# Patient Record
Sex: MALE | Race: WHITE | NOT HISPANIC OR LATINO | Employment: FULL TIME | URBAN - METROPOLITAN AREA
[De-identification: names, ages, dates, MRNs, and addresses within clinical notes are randomized per-mention and may not be internally consistent; named-entity substitution may affect disease eponyms.]

---

## 2020-10-15 ENCOUNTER — HISTORICAL (OUTPATIENT)
Dept: WOUND CARE | Facility: HOSPITAL | Age: 30
End: 2020-10-15

## 2021-06-10 ENCOUNTER — HISTORICAL (OUTPATIENT)
Dept: ADMINISTRATIVE | Facility: HOSPITAL | Age: 31
End: 2021-06-10

## 2021-06-10 LAB
ABS NEUT (OLG): 5.4 X10(3)/MCL (ref 2.1–9.2)
ALBUMIN SERPL-MCNC: 5.1 GM/DL (ref 3.4–5)
ALBUMIN/GLOB SERPL: 1.96 {RATIO} (ref 1.5–2.5)
ALP SERPL-CCNC: 84 UNIT/L (ref 38–126)
ALT SERPL-CCNC: 69 UNIT/L (ref 7–52)
AST SERPL-CCNC: 32 UNIT/L (ref 15–37)
BILIRUB SERPL-MCNC: 0.4 MG/DL (ref 0.2–1)
BILIRUBIN DIRECT+TOT PNL SERPL-MCNC: 0.1 MG/DL (ref 0–0.5)
BILIRUBIN DIRECT+TOT PNL SERPL-MCNC: 0.3 MG/DL
BUN SERPL-MCNC: 19 MG/DL (ref 7–18)
CALCIUM SERPL-MCNC: 10.1 MG/DL (ref 8.5–10.1)
CHLORIDE SERPL-SCNC: 102 MMOL/L (ref 98–107)
CHOLEST SERPL-MCNC: 318 MG/DL (ref 0–200)
CHOLEST/HDLC SERPL: 9.1 {RATIO}
CO2 SERPL-SCNC: 26 MMOL/L (ref 21–32)
CREAT SERPL-MCNC: 1.07 MG/DL (ref 0.6–1.3)
ERYTHROCYTE [DISTWIDTH] IN BLOOD BY AUTOMATED COUNT: 12.1 % (ref 11.5–17)
EST. AVERAGE GLUCOSE BLD GHB EST-MCNC: 126 MG/DL
GLOBULIN SER-MCNC: 2.6 GM/DL (ref 1.2–3)
GLUCOSE SERPL-MCNC: 123 MG/DL (ref 74–106)
HBA1C MFR BLD: 6 % (ref 4.4–6.4)
HCT VFR BLD AUTO: 45.5 % (ref 42–52)
HDLC SERPL-MCNC: 35 MG/DL (ref 35–60)
HGB BLD-MCNC: 15.8 GM/DL (ref 14–18)
LDLC SERPL CALC-MCNC: 120 MG/DL (ref 0–129)
LYMPHOCYTES # BLD AUTO: 3.4 X10(3)/MCL (ref 0.6–3.4)
LYMPHOCYTES NFR BLD AUTO: 37 % (ref 13–40)
MCH RBC QN AUTO: 30.7 PG (ref 27–31.2)
MCHC RBC AUTO-ENTMCNC: 35 GM/DL (ref 32–36)
MCV RBC AUTO: 88 FL (ref 80–94)
MONOCYTES # BLD AUTO: 0.5 X10(3)/MCL (ref 0.1–1.3)
MONOCYTES NFR BLD AUTO: 5.9 % (ref 0.1–24)
NEUTROPHILS NFR BLD AUTO: 57.1 % (ref 47–80)
PLATELET # BLD AUTO: 309 X10(3)/MCL (ref 130–400)
PMV BLD AUTO: 9.5 FL (ref 9.4–12.4)
POTASSIUM SERPL-SCNC: 4.5 MMOL/L (ref 3.5–5.1)
PROT SERPL-MCNC: 7.7 GM/DL (ref 6.4–8.2)
RBC # BLD AUTO: 5.14 X10(6)/MCL (ref 4.7–6.1)
SODIUM SERPL-SCNC: 139 MMOL/L (ref 136–145)
TRIGL SERPL-MCNC: 1143 MG/DL (ref 30–150)
TSH SERPL-ACNC: 2.75 MIU/ML (ref 0.35–4.94)
VLDLC SERPL CALC-MCNC: 228.6 MG/DL
WBC # SPEC AUTO: 9.3 X10(3)/MCL (ref 4.5–11.5)

## 2022-04-10 ENCOUNTER — HISTORICAL (OUTPATIENT)
Dept: ADMINISTRATIVE | Facility: HOSPITAL | Age: 32
End: 2022-04-10

## 2022-04-30 VITALS
DIASTOLIC BLOOD PRESSURE: 80 MMHG | BODY MASS INDEX: 30.36 KG/M2 | WEIGHT: 212.06 LBS | HEIGHT: 70 IN | OXYGEN SATURATION: 97 % | SYSTOLIC BLOOD PRESSURE: 129 MMHG

## 2022-05-02 NOTE — HISTORICAL [facility]
This is a historical note converted from Pily. Formatting and pictures may have been removed.  Please reference Pily for original formatting and attached multimedia. Chief Complaint  Avulsion of fingertip at work  History of Present Illness  Incision/Wounds  ?1. Finger Right Other: tip Avulsion?- last charted: 11/05/2020 10:16  ?? ??Assessment Done By?- Wound Care Team  ?? ??Dressing Type?- Collagen, Nonadherent dressing, Rolled Gauze, Tape  ?? ??Dressing Assessment?- Drainage present, Intact  ?? ??Dressing Activity?- Removed  ?? ??Cleansing?- Cleaned with normal saline  ?? ??Length?- 0.8 cm  ?? ??Width?- 0.9 cm  ?? ??Depth?- 0.1 cm  ?? ??Wound Bed Tissue Type?- Epithelialized, Granulating, Necrotic tissue, slough  ?? ??Percent Granulated?- 65 %  ?? ??Percent Epithelialized?- 35 %  ?? ??Exudate Amount?- Small  ?? ??Exudate Type?- Serous  ?? ??Exudate Odor?- None  ?? ??Edge?- Well defined  ?? ??Surrounding Tissue Color?- Normal  ?? ??Surrounding Tissue Condition?- Intact  Area is granulating well.? Epithelialize well.? Responding to collagen?dressings.? Patient is continue collagen every other day and?return in 1 week for follow-up.  Physical Exam  Vitals & Measurements  T:?36.6? ?C (Oral)? HR:?82(Peripheral)? RR:?18? BP:?120/86? SpO2:?99%?  HT:?176?cm? WT:?98?kg?  Assessment/Plan  Avulsion injury?T14.8XXA  Ordered:  Wound Care Outpatient, *Est. 11/12/20 3:00:00 CST, *Est. Stop date 11/12/20 3:00:00 CST, [facility], FU with Physician in 1 week  Wound Care Team Treatment, 11/05/20 10:34:00 CST, Stop date 11/05/20 10:34:00 CST, Collagen every other day and return in 1 week  ?   Problem List/Past Medical History  Ongoing  No qualifying data  Historical  No qualifying data  Procedure/Surgical History  none   Medications  Keflex 250 mg oral capsule, 250 mg= 1 cap(s), Oral, QID,? ?Not Taking, Completed Rx  Norco 325 mg-5 mg oral tablet, 1 tab(s), Oral, q6hr, PRN,? ?Not Taking, Completed Rx  Allergies  No Known  Allergies  Social History  Abuse/Neglect  No, 10/15/2020  Tobacco  Former smoker, quit more than 30 days ago, Cigarettes, No, 10/15/2020  10 or more cigarettes (1/2 pack or more)/day in last 30 days, Yes, 05/18/2019  Family History  Family history is negative  Immunizations  Vaccine Date Status   tetanus/diphtheria/pertussis, acel(Tdap) 10/08/2020 Given   Health Maintenance  Health Maintenance  ???Pending?(in the next year)  ??? ??OverDue  ??? ? ? ?Obesity Screening due??01/01/20??and every 1??year(s)  ??? ? ? ?Alcohol Misuse Screening due??01/02/20??and every 1??year(s)  ??? ? ? ?Body Mass Index Check due??05/17/20??and every 1??year(s)  ??? ??Due?  ??? ? ? ?Influenza Vaccine due??10/01/20??and every 1??day(s)  ??? ? ? ?ADL Screening due??11/05/20??and every 1??year(s)  ??? ? ? ?Depression Screening due??11/05/20??Unknown Frequency  ??? ??Due In Future?  ??? ? ? ?Blood Pressure Screening not due until??10/29/21??and every 1??year(s)  ???Satisfied?(in the past 1 year)  ??? ??Satisfied?  ??? ? ? ?Blood Pressure Screening on??11/05/20.??Satisfied by SATNAM Groves Shawndala  ??? ? ? ?Influenza Vaccine on??10/15/20.??Satisfied by Marion BALLARD, My Samuels  ??? ? ? ?Tetanus Vaccine on??10/08/20.??Satisfied by Robert BALLARD, Katarina Bond  ?

## 2022-05-02 NOTE — HISTORICAL [facility]
This is a historical note converted from Pily. Formatting and pictures may have been removed.  Please reference Pily for original formatting and attached multimedia. Chief Complaint  Avulsion of fingertip at work  History of Present Illness  see nurse notes  Review of Systems  see nurse notes  Physical Exam  Vitals & Measurements  T:?36.3? ?C (Oral)? HR:?110(Peripheral)? RR:?18? BP:?118/86? SpO2:?96%?  HT:?176?cm? WT:?98?kg?  see nurse notes  Assessment/Plan  Avulsion injury?T14.8XXA  ?Incision/Wounds  ?1. Finger Right Other: tip Avulsion?- last charted: 12/03/2020 10:32  ?? ??Assessment Done By?- Wound Care Team  ?? ??Dressing Type?- Rolled Gauze, Tape  ?? ??Dressing Assessment?- Dry, Intact  ?? ??Dressing Activity?- Removed  ?? ??Cleansing?- Cleaned with normal saline  ?? ??Length?- 0.1 cm  ?? ??Width?- 0.7 cm  ?? ??Depth?- 0 cm  ?? ??Wound Bed Tissue Type?- Scab  ?? ??Exudate Amount?- None  ?? ??Edge?- Approximated  ?? ??Surrounding Tissue Color?- Normal  ?? ??Surrounding Tissue Condition?- Intact  ?? ??Status?- Healed, Improving  ?Pt examined and review notes above. Wound is healed. Plans is to D/C the patient and follow up as needed.  Ordered:  Wound Care Team Treatment, 12/03/20 10:36:00 CST, Stop date 12/03/20 10:36:00 CST, discharge patient and rtc if need .  ?   Problem List/Past Medical History  Ongoing  No qualifying data  Historical  No qualifying data  Procedure/Surgical History  none   Medications  Keflex 250 mg oral capsule, 250 mg= 1 cap(s), Oral, QID,? ?Not Taking, Completed Rx  Norco 325 mg-5 mg oral tablet, 1 tab(s), Oral, q6hr, PRN,? ?Not Taking, Completed Rx  Allergies  No Known Allergies  Social History  Abuse/Neglect  No, 10/15/2020  Tobacco  Former smoker, quit more than 30 days ago, Cigarettes, No, 10/15/2020  10 or more cigarettes (1/2 pack or more)/day in last 30 days, Yes, 05/18/2019  Family History  Family history is negative  Immunizations  Vaccine Date Status    tetanus/diphtheria/pertussis, acel(Tdap) 10/08/2020 Given   Health Maintenance  Health Maintenance  ???Pending?(in the next year)  ??? ??OverDue  ??? ? ? ?Obesity Screening due??01/01/20??and every 1??year(s)  ??? ? ? ?Alcohol Misuse Screening due??01/02/20??and every 1??year(s)  ??? ? ? ?Body Mass Index Check due??05/17/20??and every 1??year(s)  ??? ? ? ?Influenza Vaccine due??10/01/20??and every 1??day(s)  ??? ??Due?  ??? ? ? ?ADL Screening due??12/03/20??and every 1??year(s)  ??? ? ? ?Depression Screening due??12/03/20??Unknown Frequency  ??? ??Due In Future?  ??? ? ? ?Blood Pressure Screening not due until??11/25/21??and every 1??year(s)  ???Satisfied?(in the past 1 year)  ??? ??Satisfied?  ??? ? ? ?Blood Pressure Screening on??12/03/20.??Satisfied by Zoey Medina  ??? ? ? ?Influenza Vaccine on??10/15/20.??Satisfied by My Schultz RN  ??? ? ? ?Tetanus Vaccine on??10/08/20.??Satisfied by Robert BALLARD, Katarina Bond  ?

## 2022-05-02 NOTE — HISTORICAL [facility]
This is a historical note converted from Pily. Formatting and pictures may have been removed.  Please reference Pliy for original formatting and attached multimedia. Chief Complaint  Avulsion of fingertip at work  History of Present Illness  see nurse notes  Review of Systems  see nurse notes  Physical Exam  Vitals & Measurements  T:?36.6? ?C (Oral)? HR:?89(Monitored)? RR:?18? BP:?116/78? SpO2:?98%?  HT:?176?cm? WT:?98?kg?  see nurse notes  Assessment/Plan  Avulsion injury?T14.8XXA  ?Incision/Wounds  ?1. Finger Right Other: tip Avulsion?- last charted: 10/29/2020 10:39  ?? ??Assessment Done By?- Wound Care Team  ?? ??Dressing Type?- Collagen, Nonadherent dressing, Rolled Gauze, Tape  ?? ??Dressing Assessment?- Drainage present, Intact  ?? ??Dressing Activity?- Changed  ?? ??Cleansing?- Cleaned with normal saline  ?? ??Length?- 0.7 cm  ?? ??Width?- 1.0 cm  ?? ??Depth?- 0.1 cm  ?? ??Wound Bed Tissue Type?- Epithelialized, Granulating, Necrotic tissue, slough  ?? ??Percent Granulated?- 65 %  ?? ??Percent Epithelialized?- 35 %  ?? ??Exudate Amount?- Small  ?? ??Exudate Type?- Serous  ?? ??Exudate Odor?- None  ?? ??Edge?- Well defined  ?? ??Surrounding Tissue Color?- Normal  ?? ??Surrounding Tissue Condition?- Intact  ?Pt examined and notes review above. Plans is to continue collagen QOD. RTC in 1 week. Wound is looking great.  Ordered:  Wound Care Outpatient, *Est. 11/12/20 3:00:00 CST, *Est. Stop date 11/12/20 3:00:00 CST, [facility], Return to Clinic 2 weeks  Wound Care Outpatient, *Est. 11/05/20 3:00:00 CST, *Est. Stop date 11/05/20 3:00:00 CST, [facility], FU with Physician in 1 week  Wound Care Team Treatment, 10/29/20 11:00:00 CDT, Stop date 10/29/20 11:00:00 CDT, continue colagen QOD. RTC in 1 week  ?   Problem List/Past Medical History  Ongoing  No qualifying data  Historical  No qualifying data  Procedure/Surgical History  none   Medications  Keflex 250 mg oral capsule, 250 mg= 1 cap(s),  Oral, QID,? ?Not Taking, Completed Rx  Norco 325 mg-5 mg oral tablet, 1 tab(s), Oral, q6hr, PRN,? ?Not Taking, Completed Rx  Allergies  No Known Allergies  Social History  Abuse/Neglect  No, 10/15/2020  Tobacco  Former smoker, quit more than 30 days ago, Cigarettes, No, 10/15/2020  10 or more cigarettes (1/2 pack or more)/day in last 30 days, Yes, 05/18/2019  Family History  Family history is negative  Immunizations  Vaccine Date Status   tetanus/diphtheria/pertussis, acel(Tdap) 10/08/2020 Given   Health Maintenance  Health Maintenance  ???Pending?(in the next year)  ??? ??OverDue  ??? ? ? ?Influenza Vaccine due??10/01/19??and every 1??day(s)  ??? ? ? ?Obesity Screening due??01/01/20??and every 1??year(s)  ??? ? ? ?Alcohol Misuse Screening due??01/02/20??and every 1??year(s)  ??? ? ? ?Body Mass Index Check due??05/17/20??and every 1??year(s)  ??? ??Due?  ??? ? ? ?ADL Screening due??10/29/20??and every 1??year(s)  ??? ? ? ?Depression Screening due??10/29/20??Unknown Frequency  ??? ??Due In Future?  ??? ? ? ?Blood Pressure Screening not due until??10/26/21??and every 1??year(s)  ???Satisfied?(in the past 1 year)  ??? ??Satisfied?  ??? ? ? ?Blood Pressure Screening on??10/29/20.??Satisfied by SATNAM Groves Shawndala  ??? ? ? ?Influenza Vaccine on??10/15/20.??Satisfied by Marion BALLARD, My Samuels  ??? ? ? ?Tetanus Vaccine on??10/08/20.??Satisfied by Robert BALLARD, Katarina Bond  ?

## 2022-05-02 NOTE — HISTORICAL [facility]
This is a historical note converted from Pily. Formatting and pictures may have been removed.  Please reference Pily for original formatting and attached multimedia. Chief Complaint  Avulsion of fingertip at work  History of Present Illness  see nurse notes  Review of Systems  see nurse notes  Physical Exam  Vitals & Measurements  T:?36.8? ?C (Oral)? HR:?64(Monitored)? RR:?18? BP:?125/82? SpO2:?98%?  HT:?176?cm? WT:?98?kg?  see nurse notes  Assessment/Plan  Avulsion injury?T14.8XXA  Incision/Wounds  ?1. Finger Right Other: tip Avulsion?- last charted: 10/22/2020 10:41  ?? ??Assessment Done By?- Wound Care Team  ?? ??Dressing Type?- Medicated packing strips, Nonadherent dressing, Rolled Gauze, Tape  ?? ??Dressing Assessment?- Drainage present, Intact  ?? ??Dressing Activity?- Changed  ?? ??Cleansing?- Cleaned with normal saline  ?? ??Length?- 1.5 cm  ?? ??Width?- 1.4 cm  ?? ??Depth?- 0.1 cm  ?? ??Wound Bed Tissue Type?- Epithelialized, Granulating, Necrotic tissue, slough  ?? ??Percent Granulated?- 50 %  ?? ??Percent Epithelialized?- 20 %  ?? ??Percent Necrotic Tissue Slough?- 30 %  ?? ??Exudate Amount?- Small  ?? ??Exudate Type?- Serosanguineous  ?? ??Exudate Odor?- None  ?? ??Edge?- Well defined  ?? ??Surrounding Tissue Color?- White  ?? ??Surrounding Tissue Condition?- Intact  ?Pt examined and review notes above. Pt wound is looking better, a debridment was done today using curette and pickups from unviable sub tissue to viable tissue. S/p debridment measurements 1.5x1.4x0.3. Lidocaine 2% was use before wound care. RTC monday?to reevaluate for collagen and RTC in 1 week for MD.  Orders:  Wound Care Outpatient, *Est. 10/24/20 3:00:00 CDT, *Est. Stop date 10/24/20 3:00:00 CDT, [facility], FU with Nurse monday  Wound Care Outpatient, *Est. 10/29/20 3:00:00 CDT, *Est. Stop date 10/29/20 3:00:00 CDT, [facility], FU with Physician in 1 week  Wound Care Team Treatment, 10/22/20 11:13:00 CDT, Stop  date 10/22/20 11:13:00 CDT, continue mesalt QD RTC mondqay for nurse visit and RTC in 1 week with MD.   Problem List/Past Medical History  Ongoing  No qualifying data  Historical  No qualifying data  Procedure/Surgical History  none   Medications  Keflex 250 mg oral capsule, 250 mg= 1 cap(s), Oral, QID,? ?Not Taking, Completed Rx  Norco 325 mg-5 mg oral tablet, 1 tab(s), Oral, q6hr, PRN,? ?Not Taking, Completed Rx  Allergies  No Known Allergies  Social History  Abuse/Neglect  No, 10/15/2020  Tobacco  Former smoker, quit more than 30 days ago, Cigarettes, No, 10/15/2020  10 or more cigarettes (1/2 pack or more)/day in last 30 days, Yes, 05/18/2019  Family History  Family history is negative  Immunizations  Vaccine Date Status   tetanus/diphtheria/pertussis, acel(Tdap) 10/08/2020 Given   Health Maintenance  Health Maintenance  ???Pending?(in the next year)  ??? ??OverDue  ??? ? ? ?Influenza Vaccine due??10/01/19??and every 1??day(s)  ??? ? ? ?Obesity Screening due??01/01/20??and every 1??year(s)  ??? ? ? ?Alcohol Misuse Screening due??01/02/20??and every 1??year(s)  ??? ? ? ?Body Mass Index Check due??05/17/20??and every 1??year(s)  ??? ??Due?  ??? ? ? ?ADL Screening due??10/22/20??and every 1??year(s)  ??? ? ? ?Depression Screening due??10/22/20??and every?  ??? ??Due In Future?  ??? ? ? ?Blood Pressure Screening not due until??10/15/21??and every 1??year(s)  ???Satisfied?(in the past 1 year)  ??? ??Satisfied?  ??? ? ? ?Blood Pressure Screening on??10/22/20.??Satisfied by SATNAM Groves, Jessi  ??? ? ? ?Influenza Vaccine on??10/15/20.??Satisfied by Marion BALLARD, My Samuels  ??? ? ? ?Tetanus Vaccine on??10/08/20.??Satisfied by Robert BALLARD, Katarina Bond  ?

## 2022-05-02 NOTE — HISTORICAL [facility]
This is a historical note converted from Pily. Formatting and pictures may have been removed.  Please reference Pily for original formatting and attached multimedia. Chief Complaint  Avulsion of fingertip at work  Physical Exam  Vitals & Measurements  T:?36.8? ?C (Oral)? HR:?91(Peripheral)? BP:?146/84? SpO2:?98%?  HT:?176?cm? WT:?98?kg?  Assessment/Plan  Avulsion injury?T14.8XXA  ?Incision/Wounds  ?1. Finger Right Other: tip Avulsion?- last charted: 10/15/2020 10:00  ?? ??Assessment Done By?- Wound Care Team  ?? ??Dressing Type?- Rolled Gauze, Tape  ?? ??Dressing Assessment?- Drainage present, Intact  ?? ??Dressing Activity?- Changed  ?? ??Cleansing?- Cleaned with normal saline  ?? ??Length?- 2 cm  ?? ??Width?- 2.2 cm  ?? ??Depth?- 0.1 cm  ?? ??Wound Bed Tissue Type?- Granulating, Necrotic tissue, slough  ?? ??Exudate Amount?- Small  ?? ??Exudate Type?- Serosanguineous  ?? ??Exudate Odor?- None  ?? ??Edge?- Well defined  ?? ??Surrounding Tissue Color?- White  ?? ??Surrounding Tissue Condition?- Maceration  Patient referred from the emergency room following an avulsion injury to the tip of the right?index finger?is examined?chart x-rays?emergency room documentation was reviewed?the wound described above?no signs of infection?no exposed distal phalanx?recommending that the patient use Mepitel as a contact layer?apply Mesalt?over that with a cover?of gauze 8?change the Mesalt daily and the Mepitel contact layer every 3 to 5 days as needed and follow-up with us in 1 week?also been instructed since he works here in town?that he can follow-up anytime?if there is any concerns or?redness or increasing pain or fever?or any signs of?worsening of the wound condition  Orders:  Wound Care Outpatient, *Est. 10/22/20 3:00:00 CDT, *Est. Stop date 10/22/20 3:00:00 CDT, [facility], FU with Physician in 1 week  Wound Care Team Treatment, 10/15/20 10:33:00 CDT, Stop date 10/15/20 10:33:00 CDT, apply mepitel as contact  layer and use mesalt daily change mepitel q 3 to 5 days   Problem List/Past Medical History  Ongoing  No qualifying data  Historical  No qualifying data  Procedure/Surgical History  none   Medications  Augmentin 875 mg-125 mg oral tablet, 1 tab(s), Oral, q12hr  Keflex 250 mg oral capsule, 250 mg= 1 cap(s), Oral, QID,? ?Not Taking, Completed Rx  Norco 325 mg-5 mg oral tablet, 1 tab(s), Oral, q6hr, PRN,? ?Not Taking, Completed Rx  Allergies  No Known Allergies  Social History  Abuse/Neglect  No, 10/15/2020  Tobacco  Former smoker, quit more than 30 days ago, Cigarettes, No, 10/15/2020  10 or more cigarettes (1/2 pack or more)/day in last 30 days, Yes, 05/18/2019  Family History  Family history is negative  Immunizations  Vaccine Date Status   tetanus/diphtheria/pertussis, acel(Tdap) 10/08/2020 Given   Health Maintenance  Health Maintenance  ???Pending?(in the next year)  ??? ??OverDue  ??? ? ? ?Influenza Vaccine due??10/01/19??and every 1??day(s)  ??? ? ? ?Obesity Screening due??01/01/20??and every 1??year(s)  ??? ? ? ?Alcohol Misuse Screening due??01/02/20??and every 1??year(s)  ??? ? ? ?Blood Pressure Screening due??05/17/20??and every 1??year(s)  ??? ? ? ?Body Mass Index Check due??05/17/20??and every 1??year(s)  ??? ??Due?  ??? ? ? ?ADL Screening due??10/15/20??and every 1??year(s)  ??? ? ? ?Depression Screening due??10/15/20??and every?  ???Satisfied?(in the past 1 year)  ??? ??Satisfied?  ??? ? ? ?Blood Pressure Screening on??10/15/20.??Satisfied by My Schultz RN  ??? ? ? ?Influenza Vaccine on??10/15/20.??Satisfied by My Schultz RN  ??? ? ? ?Tetanus Vaccine on??10/08/20.??Satisfied by Robert BALLARD, Katarina Bond  ?

## 2022-05-02 NOTE — HISTORICAL [facility] CERN[facility]
This is a historical note converted from Pily. Formatting and pictures may have been removed.  Please reference Pily for original formatting and attached multimedia. Chief Complaint  Avulsion of fingertip at work  History of Present Illness  Patient is currently using?gentamicin?on his?right index finger due to heavy colonization.? Additionally he admits to being noncompliant in changing the dressing?daily as advised!  Physical Exam  Vitals & Measurements  T:?36.5? ?C (Oral)? HR:?80(Peripheral)? RR:?18? BP:?120/80? SpO2:?97%?  HT:?176?cm? WT:?98?kg?  On examination he has?a healing wound at the tip of the?right?index finger?has new?areas of epithelialization?and good granulation tissue deposit. ?There is no evidence of?infection?or?slough.  ?  Incision/Wounds  ?1. Finger Right Other: tip Avulsion?- last charted: 11/19/2020 10:18  ?? ??Assessment Done By?- Wound Care Team  ?? ??Dressing Type?- Rolled Gauze, Tape  ?? ??Dressing Assessment?- Drainage present, Intact  ?? ??Dressing Activity?- Changed  ?? ??Cleansing?- Cleaned with normal saline  ?? ??Length?- 0.5 cm  ?? ??Width?- 0.7 cm  ?? ??Depth?- 0.1 cm  ?? ??Wound Bed Tissue Type?- Epithelialized, Granulating  ?? ??Percent Granulated?- 85 %  ?? ??Percent Epithelialized?- 15 %  ?? ??Exudate Amount?- Small  ?? ??Exudate Type?- Serous  ?? ??Exudate Odor?- None  ?? ??Edge?- Well defined  ?? ??Surrounding Tissue Color?- Normal  ?? ??Surrounding Tissue Condition?- Intact, Maceration  ?? ??Status?- Improving  ?  Assessment/Plan  Avulsion injury?T14.8XXA  ?Continue current therapy.  Ordered:  Wound Care Outpatient, *Est. 11/26/20 3:00:00 CST, *Est. Stop date 11/26/20 3:00:00 CST, [facility], FU with Physician in 1 week  Wound Care Team Treatment, 11/19/20 10:33:00 CST, Stop date 11/19/20 10:33:00 CST, Continue applying gentamicin to wound and change dressing daily.  ?   Problem List/Past Medical History  Ongoing  No qualifying data  Historical  No qualifying  data  Procedure/Surgical History  none   Medications  Keflex 250 mg oral capsule, 250 mg= 1 cap(s), Oral, QID,? ?Not Taking, Completed Rx  Norco 325 mg-5 mg oral tablet, 1 tab(s), Oral, q6hr, PRN,? ?Not Taking, Completed Rx  Allergies  No Known Allergies  Social History  Abuse/Neglect  No, 10/15/2020  Tobacco  Former smoker, quit more than 30 days ago, Cigarettes, No, 10/15/2020  10 or more cigarettes (1/2 pack or more)/day in last 30 days, Yes, 05/18/2019  Family History  Family history is negative  Immunizations  Vaccine Date Status   tetanus/diphtheria/pertussis, acel(Tdap) 10/08/2020 Given   Health Maintenance  Health Maintenance  ???Pending?(in the next year)  ??? ??OverDue  ??? ? ? ?Obesity Screening due??01/01/20??and every 1??year(s)  ??? ? ? ?Alcohol Misuse Screening due??01/02/20??and every 1??year(s)  ??? ? ? ?Body Mass Index Check due??05/17/20??and every 1??year(s)  ??? ??Due?  ??? ? ? ?Influenza Vaccine due??10/01/20??and every 1??day(s)  ??? ? ? ?ADL Screening due??11/19/20??and every 1??year(s)  ??? ? ? ?Depression Screening due??11/19/20??Unknown Frequency  ??? ??Due In Future?  ??? ? ? ?Blood Pressure Screening not due until??11/12/21??and every 1??year(s)  ???Satisfied?(in the past 1 year)  ??? ??Satisfied?  ??? ? ? ?Blood Pressure Screening on??11/19/20.??Satisfied by My Schultz RN  ??? ? ? ?Influenza Vaccine on??10/15/20.??Satisfied by My Schultz RN  ??? ? ? ?Tetanus Vaccine on??10/08/20.??Satisfied by Katarina Jones RN  ?

## 2022-05-02 NOTE — HISTORICAL [facility] CERN[facility]
This is a historical note converted from Pily. Formatting and pictures may have been removed.  Please reference Pily for original formatting and attached multimedia. Chief Complaint  Avulsion of fingertip at work  History of Present Illness  see nurse notes  Review of Systems  see nurse notes  Physical Exam  Vitals & Measurements  T:?36.7? ?C (Oral)? HR:?92(Peripheral)? RR:?18? BP:?128/82? SpO2:?98%?  HT:?176?cm? WT:?98?kg?  see nurse notes  Assessment/Plan  Avulsion injury?T14.8XXA  ?Incision/Wounds  ?1. Finger Right Other: tip Avulsion?- last charted: 11/12/2020 10:16  ?? ??Assessment Done By?- Wound Care Team  ?? ??Dressing Type?- Rolled Gauze, Tape  ?? ??Dressing Assessment?- Drainage present, Intact  ?? ??Dressing Activity?- Removed  ?? ??Cleansing?- Cleaned with normal saline  ?? ??Length?- 0.6 cm  ?? ??Width?- 0.8 cm  ?? ??Depth?- 0.1 cm  ?? ??Wound Bed Tissue Type?- Epithelialized, Granulating, Necrotic tissue, slough  ?? ??Percent Granulated?- 85 %  ?? ??Percent Epithelialized?- 15 %  ?? ??Exudate Amount?- Small  ?? ??Exudate Type?- Serous  ?? ??Exudate Odor?- None  ?? ??Edge?- Well defined  ?? ??Surrounding Tissue Color?- Normal  ?? ??Surrounding Tissue Condition?- Intact, Maceration  ?? ??Status?- Improving  ?Pt examined and review notes above. Start gentamycin oint, adaptive and wrap finger every day. RTC in 1 week  Ordered:  Wound Care Outpatient, *Est. 11/19/20 3:00:00 CST, *Est. Stop date 11/19/20 3:00:00 CST, [facility], FU with Physician in 1 week  Wound Care Team Treatment, 11/12/20 10:28:00 CST, Stop date 11/12/20 10:28:00 CST, gentamycin oint daily,adaptive and wrap finger. RTC in 1 week  ?   Problem List/Past Medical History  Ongoing  No qualifying data  Historical  No qualifying data  Procedure/Surgical History  none   Medications  Keflex 250 mg oral capsule, 250 mg= 1 cap(s), Oral, QID,? ?Not Taking, Completed Rx  Norco 325 mg-5 mg oral tablet, 1 tab(s), Oral, q6hr, PRN,? ?Not  Taking, Completed Rx  Allergies  No Known Allergies  Social History  Abuse/Neglect  No, 10/15/2020  Tobacco  Former smoker, quit more than 30 days ago, Cigarettes, No, 10/15/2020  10 or more cigarettes (1/2 pack or more)/day in last 30 days, Yes, 05/18/2019  Family History  Family history is negative  Immunizations  Vaccine Date Status   tetanus/diphtheria/pertussis, acel(Tdap) 10/08/2020 Given   Health Maintenance  Health Maintenance  ???Pending?(in the next year)  ??? ??OverDue  ??? ? ? ?Obesity Screening due??01/01/20??and every 1??year(s)  ??? ? ? ?Alcohol Misuse Screening due??01/02/20??and every 1??year(s)  ??? ? ? ?Body Mass Index Check due??05/17/20??and every 1??year(s)  ??? ??Due?  ??? ? ? ?Influenza Vaccine due??10/01/20??and every 1??day(s)  ??? ? ? ?ADL Screening due??11/12/20??and every 1??year(s)  ??? ? ? ?Depression Screening due??11/12/20??Unknown Frequency  ??? ??Due In Future?  ??? ? ? ?Blood Pressure Screening not due until??11/05/21??and every 1??year(s)  ???Satisfied?(in the past 1 year)  ??? ??Satisfied?  ??? ? ? ?Blood Pressure Screening on??11/12/20.??Satisfied by My Schultz RN  ??? ? ? ?Influenza Vaccine on??10/15/20.??Satisfied by My Schultz RN  ??? ? ? ?Tetanus Vaccine on??10/08/20.??Satisfied by Katarina Jones RN  ?

## 2022-05-02 NOTE — HISTORICAL [facility] CERNER
This is a historical note converted from Pily. Formatting and pictures may have been removed.  Please reference Pily for original formatting and attached multimedia. Chief Complaint  Avulsion of fingertip at work  History of Present Illness  Incision/Wounds  ?1. Finger Right Other: tip Avulsion?- last charted: 11/25/2020 10:04  ?? ??Assessment Done By?- Wound Care Team  ?? ??Dressing Type?- Rolled Gauze, Tape  ?? ??Dressing Assessment?- Drainage present, Intact  ?? ??Dressing Activity?- Changed  ?? ??Cleansing?- Cleaned with normal saline  ?? ??Length?- 0.1 cm  ?? ??Width?- 0.4 cm  ?? ??Depth?- 0.1 cm  ?? ??Wound Bed Tissue Type?- Epithelialized, Granulating  ?? ??Percent Granulated?- 90 %  ?? ??Percent Epithelialized?- 10 %  ?? ??Exudate Amount?- Scant  ?? ??Exudate Type?- Serosanguineous  ?? ??Exudate Odor?- None  ?? ??Edge?- Well defined  ?? ??Surrounding Tissue Color?- Normal  ?? ??Surrounding Tissue Condition?- Intact, Maceration  ?? ??Status?- Improving  Area healing well with 1?small area?of granulation tissue.? Erythema improved.? Nail growing in?superiorly.? Patient will continue?gentamicin ointment?and nonstick dressing.? This will be done on a daily basis. ?Patient should return in 1 week for follow-up.? [facility]  Physical Exam  Vitals & Measurements  T:?36.5? ?C (Oral)? HR:?69(Peripheral)? RR:?18? BP:?110/75? SpO2:?98%?  HT:?176?cm? WT:?98?kg?  Assessment/Plan  Avulsion injury?T14.8XXA  Ordered:  Wound Care Outpatient, *Est. 12/02/20 3:00:00 CST, *Est. Stop date 12/02/20 3:00:00 CST, [facility], FU with Physician in 1 week  Wound Care Team Treatment, 11/25/20 10:16:00 CST, Stop date 11/25/20 10:16:00 CST, Clean wound and use gentamicin. Patient return in 1 week with daily dressing changes.  ?   Problem List/Past Medical History  Ongoing  No qualifying data  Historical  No qualifying data  Procedure/Surgical History  none   Medications  Keflex 250  mg oral capsule, 250 mg= 1 cap(s), Oral, QID,? ?Not Taking, Completed Rx  Norco 325 mg-5 mg oral tablet, 1 tab(s), Oral, q6hr, PRN,? ?Not Taking, Completed Rx  Allergies  No Known Allergies  Social History  Abuse/Neglect  No, 10/15/2020  Tobacco  Former smoker, quit more than 30 days ago, Cigarettes, No, 10/15/2020  10 or more cigarettes (1/2 pack or more)/day in last 30 days, Yes, 05/18/2019  Family History  Family history is negative  Immunizations  Vaccine Date Status   tetanus/diphtheria/pertussis, acel(Tdap) 10/08/2020 Given   Health Maintenance  Health Maintenance  ???Pending?(in the next year)  ??? ??OverDue  ??? ? ? ?Obesity Screening due??01/01/20??and every 1??year(s)  ??? ? ? ?Alcohol Misuse Screening due??01/02/20??and every 1??year(s)  ??? ? ? ?Body Mass Index Check due??05/17/20??and every 1??year(s)  ??? ??Due?  ??? ? ? ?Influenza Vaccine due??10/01/20??and every 1??day(s)  ??? ? ? ?ADL Screening due??11/25/20??and every 1??year(s)  ??? ? ? ?Depression Screening due??11/25/20??Unknown Frequency  ??? ??Due In Future?  ??? ? ? ?Blood Pressure Screening not due until??11/19/21??and every 1??year(s)  ???Satisfied?(in the past 1 year)  ??? ??Satisfied?  ??? ? ? ?Blood Pressure Screening on??11/25/20.??Satisfied by SATNAM Groves Shawndala  ??? ? ? ?Influenza Vaccine on??10/15/20.??Satisfied by My Schultz RN  ??? ? ? ?Tetanus Vaccine on??10/08/20.??Satisfied by Robert BALLARD, Katarina Bond  ?

## 2022-07-15 PROBLEM — E66.9 OBESITY: Status: ACTIVE | Noted: 2022-07-15

## 2022-07-15 PROBLEM — R73.03 PREDIABETES: Status: ACTIVE | Noted: 2022-07-15

## 2022-07-15 PROBLEM — F90.0 ADHD (ATTENTION DEFICIT HYPERACTIVITY DISORDER), INATTENTIVE TYPE: Status: ACTIVE | Noted: 2022-07-15

## 2022-07-15 PROBLEM — E78.1 HYPERTRIGLYCERIDEMIA: Status: ACTIVE | Noted: 2022-07-15

## 2022-07-15 PROBLEM — E78.5 HYPERLIPIDEMIA: Status: ACTIVE | Noted: 2022-07-15

## 2022-07-15 PROBLEM — E78.5 HYPERLIPIDEMIA: Status: RESOLVED | Noted: 2022-07-15 | Resolved: 2022-07-15

## 2022-07-15 PROBLEM — R79.89 ELEVATED LIVER FUNCTION TESTS: Status: ACTIVE | Noted: 2022-07-15

## 2022-07-15 PROBLEM — F32.A DEPRESSION: Status: ACTIVE | Noted: 2022-07-15

## 2023-06-02 PROBLEM — K76.0 NAFLD (NONALCOHOLIC FATTY LIVER DISEASE): Status: ACTIVE | Noted: 2022-07-15

## 2024-03-28 PROBLEM — Z00.00 WELLNESS EXAMINATION: Status: ACTIVE | Noted: 2024-03-28

## 2024-04-03 PROBLEM — E55.9 VITAMIN D DEFICIENCY: Status: ACTIVE | Noted: 2024-04-03
